# Patient Record
(demographics unavailable — no encounter records)

---

## 2024-10-11 NOTE — HISTORY OF PRESENT ILLNESS
[de-identified] : right knee pain pt had bad fall 10/03/2024 pt had left knee replacement  some swelling No PT  Images [facility]

## 2024-11-08 NOTE — REASON FOR VISIT
[Follow-Up Visit] : a follow-up visit for [Other: ____] : [unfilled] [FreeTextEntry2] : np pain as of today

## 2024-12-27 NOTE — REASON FOR VISIT
[Follow-Up Visit] : a follow-up visit for [Other: ____] : [unfilled] [FreeTextEntry2] : Patient is currently attending physical therapy states it is help. Patient reports she also recently fell on 12/14/2024 while shopping at [employer]. Pain level 0/10.